# Patient Record
Sex: MALE | Race: WHITE
[De-identification: names, ages, dates, MRNs, and addresses within clinical notes are randomized per-mention and may not be internally consistent; named-entity substitution may affect disease eponyms.]

---

## 2021-10-10 NOTE — EDM.PDOC
ED HPI GENERAL MEDICAL PROBLEM





- General


Chief Complaint: Chest Pain


Stated Complaint: CHEST PAIN, TROUBLE BREATHING,LUMP ON LWR LFT BACK


Time Seen by Provider: 10/10/21 01:08


Source of Information: Reports: Patient


History Limitations: Reports: No Limitations





- History of Present Illness


INITIAL COMMENTS - FREE TEXT/NARRATIVE: 


Pt is here for chest pain that started earlier today. He notes that it only 

bothers him when he takes deep breaths, then it shoots to a 9/10. It is in the 

middle of his chest and feels sharp in nature. He has some back pain, but fell 

[place] [employer] when he stepped off the curb wrong. He has a 

chronic cough, but denies any changes. No shortness of breath other than having 

to take short breaths. No radiation of the pain. No chest tightness. No fevers 

or chills. He was to have a CT of his chest by his doctor, but it was cancelled 

per the pt as he has surgery coming up on tuesday for his prostate. Pt is a long

time smoker and is down to only 1 ppd. No recent illnesses, known sick contacts 

or exposure to COVID. No nausea or vomiting. 





  ** Chest


Pain Score (Numeric/FACES): 8





- Related Data


                                    Allergies











Allergy/AdvReac Type Severity Reaction Status Date / Time


 


No Known Allergies Allergy   Verified 12/27/15 13:40











Home Meds: 


                                    Home Meds





Aspirin 325 mg PO DAILY 03/04/14 [History]


Fenofibrate,Micronized [Fenofibrate] 1 cap PO DAILY 03/04/14 [History]


Gabapentin 600 mg PO BID 03/04/14 [History]


HCTZ/Triamterene [Maxzide 50-75 MG] 1 tab PO DAILY 03/04/14 [History]


Metoprolol Tartrate 50 mg PO BID 03/04/14 [History]


Multivitamin [Multivitamins] 1 each PO DAILY 03/04/14 [History]


Naproxen Sodium [Aleve] 3 tab PO BID PRN 03/04/14 [History]











Past Medical History


Cardiovascular History: Reports: CAD, High Cholesterol, Hypertension, PVD


Gastrointestinal History: Reports: Cholelithiasis, Gastritis, GERD, GI Bleed


Musculoskeletal History: Reports: Arthritis


Neurological History: Reports: Neuropathy, Diabetic, Neuropathy, Peripheral


Endocrine/Metabolic History: Reports: Diabetes, Type II, Obesity/BMI 30+





- Infectious Disease History


Infectious Disease History: Reports: Mumps





- Past Surgical History


GI Surgical History: Reports: Cholecystectomy





Social & Family History





- Family History


Cardiac: Reports: CAD, Hypertension





- Tobacco Use


Tobacco Use Status *Q: Current Every Day Tobacco User


Years of Tobacco use: 50


Packs/Tins Daily: 1





- Caffeine Use


Caffeine Use: Reports: None





- Recreational Drug Use


Recreational Drug Use: Yes


Recreational Drug Type: Reports: Marijuana/Hashish





- Living Situation & Occupation


Living situation: Reports: , Alone


Occupation: Employed





ED ROS GENERAL





- Review of Systems


Review Of Systems: Comprehensive ROS is negative, except as noted in HPI.





ED EXAM, GENERAL





- Physical Exam


Exam: See Below


Exam Limited By: No Limitations


General Appearance: Alert, WD/WN, No Apparent Distress


Eye Exam: Bilateral Eye: Normal Inspection


Throat/Mouth: Normal Voice, No Airway Compromise


Head: Atraumatic, Normocephalic


Neck: Normal Inspection, Supple


Respiratory/Chest: No Respiratory Distress, Lungs Clear, Normal Breath Sounds, 

No Accessory Muscle Use, Chest Non-Tender


Cardiovascular: Normal Peripheral Pulses, Regular Rate, Rhythm, No Murmur


GI/Abdominal: Normal Bowel Sounds, Soft, No Distention


 (Male) Exam: Deferred


Rectal (Males) Exam: Deferred


Back Exam: Full Range of Motion, Other (bruising noted on back from fall earlier

 this week)


Extremities: Normal Inspection, Normal Range of Motion, No Pedal Edema, Normal 

Capillary Refill


Neurological: Alert, Oriented, No Motor/Sensory Deficits


Psychiatric: Normal Affect, Normal Mood


Skin Exam: Warm, Dry, Intact


Lymphatic: No Adenopathy


  ** #1 Interpretation


EKG Date: 10/10/21


Time: 00:05


Rhythm: Other (sinus tachycardia)


Rate (Beats/Min): 104


Axis: LAD-Left Axis Deviation


P-Wave: Present


QRS: Normal


ST-T: Normal


QT: Normal





Course





- Vital Signs


Last Recorded V/S: 


                                Last Vital Signs











Temp  97 F   10/10/21 00:13


 


Pulse  102 H  10/10/21 00:13


 


Resp  16   10/10/21 00:13


 


BP  148/113 H  10/10/21 00:13


 


Pulse Ox  94 L  10/10/21 00:13














- Orders/Labs/Meds


Labs: 


                                Laboratory Tests











  10/10/21 10/10/21 Range/Units





  00:13 00:13 


 


WBC  10.3 H   (5.0-10.0)  10^3/uL


 


RBC  4.73   (4.6-6.2)  10^6/uL


 


Hgb  13.4 L D   (14.0-18.0)  g/dL


 


Hct  39.5 L   (40.0-54.0)  %


 


MCV  83.5  D   ()  fL


 


MCH  28.3   (27.0-34.0)  pg


 


MCHC  33.9   (33.0-35.0)  g/dL


 


Plt Count  170   (150-450)  10^3/uL


 


Neut % (Auto)  76.8 H   (42.2-75.2)  %


 


Lymph % (Auto)  13.5 L   (20.5-50.1)  %


 


Mono % (Auto)  6.1   (2-8)  %


 


Eos % (Auto)  3.1 H   (1.0-3.0)  %


 


Baso % (Auto)  0.5   (0.0-1.0)  %


 


Sodium   137  (136-145)  mmol/L


 


Potassium   3.5  (3.5-5.1)  mmol/L


 


Chloride   99  ()  mmol/L


 


Carbon Dioxide   23  (21-32)  mmol/L


 


Anion Gap   18.5 H  (7-13)  mEq/L


 


BUN   7  (7-18)  mg/dL


 


Creatinine   0.73  (0.70-1.30)  mg/dL


 


Est Cr Clr Drug Dosing   102.53  mL/min


 


Estimated GFR (MDRD)   > 60  


 


BUN/Creatinine Ratio   9.6  (No establ ref range)  


 


Glucose   156 H  (70-99)  mg/dL


 


Calcium   8.0 L  (8.5-10.1)  mg/dL


 


Total Bilirubin   0.9  (0.2-1.0)  mg/dL


 


AST   53 H  (15-37)  U/L


 


ALT   29  (16-63)  U/L


 


Alkaline Phosphatase   175 H  ()  U/L


 


Troponin I High Sens   7  (<=76)  pg/mL


 


Total Protein   7.7  (6.4-8.2)  g/dL


 


Albumin   3.3 L  (3.4-5.0)  g/dL


 


Globulin   4.4  


 


Albumin/Globulin Ratio   0.75  














- Re-Assessments/Exams


Free Text/Narrative Re-Assessment/Exam: 


Reviewed labs and CT with the pt. Reassured pt that his work up was normal. If 

his symptoms persist, or worsen, he is to call/return to the ER. Pt verbalized 

understanding. 


10/10/21 04:15








Departure





- Departure


Time of Disposition: 04:16


Disposition: Home, Self-Care 01


Condition: Fair


Clinical Impression: 


 Atypical chest pain





Instructions:  Nonspecific Chest Pain, Adult, Easy-to-Read


Forms:  ED Department Discharge


Additional Instructions: 


Tylenol and ibuprofen as needed for pain


May also try some tums or mylanta


If pain worsens, or does not improve in the next few days, call/return to the ER


Follow up with your primary care provider in 3-5 days, or sooner if needed.





Sepsis Event Note (ED)





- Evaluation


Sepsis Screening Result: No Definite Risk





- Focused Exam


Vital Signs: 


                                   Vital Signs











  Temp Pulse Resp BP Pulse Ox


 


 10/10/21 00:13  97 F  102 H  16  148/113 H  94 L

## 2023-02-20 ENCOUNTER — HOSPITAL ENCOUNTER (EMERGENCY)
Dept: HOSPITAL 43 - DL.ED | Age: 68
Discharge: NURSING, REHAB OR LONG-TERM CARE | End: 2023-02-20
Payer: MEDICARE

## 2023-02-20 VITALS — HEART RATE: 102 BPM | DIASTOLIC BLOOD PRESSURE: 48 MMHG | SYSTOLIC BLOOD PRESSURE: 101 MMHG

## 2023-02-20 DIAGNOSIS — E03.9: ICD-10-CM

## 2023-02-20 DIAGNOSIS — K21.9: ICD-10-CM

## 2023-02-20 DIAGNOSIS — I10: ICD-10-CM

## 2023-02-20 DIAGNOSIS — E11.42: ICD-10-CM

## 2023-02-20 DIAGNOSIS — Z72.0: ICD-10-CM

## 2023-02-20 DIAGNOSIS — K92.2: Primary | ICD-10-CM

## 2023-02-20 DIAGNOSIS — Z79.899: ICD-10-CM

## 2023-02-20 DIAGNOSIS — I25.10: ICD-10-CM

## 2023-02-20 DIAGNOSIS — Z91.040: ICD-10-CM

## 2023-02-20 LAB
ANION GAP SERPL CALC-SCNC: 14.9 MEQ/L (ref 7–13)
APTT PPP: 18.7 SEC (ref 22–34)
CHLORIDE SERPL-SCNC: 101 MMOL/L (ref 98–107)
EGFRCR SERPLBLD CKD-EPI 2021: 94 ML/MIN (ref 60–?)
SODIUM SERPL-SCNC: 136 MMOL/L (ref 136–145)

## 2023-02-20 PROCEDURE — P9016 RBC LEUKOCYTES REDUCED: HCPCS

## 2023-02-20 PROCEDURE — C9113 INJ PANTOPRAZOLE SODIUM, VIA: HCPCS

## 2023-03-06 ENCOUNTER — HOSPITAL ENCOUNTER (EMERGENCY)
Dept: HOSPITAL 43 - DL.ED | Age: 68
Discharge: NURSING, REHAB OR LONG-TERM CARE | End: 2023-03-06
Payer: MEDICARE

## 2023-03-06 VITALS — DIASTOLIC BLOOD PRESSURE: 64 MMHG | SYSTOLIC BLOOD PRESSURE: 112 MMHG

## 2023-03-06 VITALS — HEART RATE: 74 BPM

## 2023-03-06 DIAGNOSIS — I25.10: ICD-10-CM

## 2023-03-06 DIAGNOSIS — E80.7: Primary | ICD-10-CM

## 2023-03-06 DIAGNOSIS — E78.00: ICD-10-CM

## 2023-03-06 DIAGNOSIS — Z79.899: ICD-10-CM

## 2023-03-06 DIAGNOSIS — K21.9: ICD-10-CM

## 2023-03-06 DIAGNOSIS — D64.9: ICD-10-CM

## 2023-03-06 DIAGNOSIS — E03.9: ICD-10-CM

## 2023-03-06 DIAGNOSIS — E66.9: ICD-10-CM

## 2023-03-06 DIAGNOSIS — Z90.49: ICD-10-CM

## 2023-03-06 DIAGNOSIS — Z91.040: ICD-10-CM

## 2023-03-06 DIAGNOSIS — Z86.73: ICD-10-CM

## 2023-03-06 DIAGNOSIS — I10: ICD-10-CM

## 2023-03-06 DIAGNOSIS — E11.40: ICD-10-CM

## 2023-03-06 LAB
ANION GAP SERPL CALC-SCNC: 14.6 MEQ/L (ref 7–13)
APTT PPP: 24.5 SEC (ref 22–34)
CHLORIDE SERPL-SCNC: 95 MMOL/L (ref 98–107)
EGFRCR SERPLBLD CKD-EPI 2021: 96 ML/MIN (ref 60–?)
SODIUM SERPL-SCNC: 130 MMOL/L (ref 136–145)

## 2023-03-06 PROCEDURE — P9016 RBC LEUKOCYTES REDUCED: HCPCS

## 2023-03-06 PROCEDURE — 83880 ASSAY OF NATRIURETIC PEPTIDE: CPT

## 2023-03-06 PROCEDURE — 86850 RBC ANTIBODY SCREEN: CPT

## 2023-03-06 PROCEDURE — 93005 ELECTROCARDIOGRAM TRACING: CPT

## 2023-03-06 PROCEDURE — 87040 BLOOD CULTURE FOR BACTERIA: CPT

## 2023-03-06 PROCEDURE — 85025 COMPLETE CBC W/AUTO DIFF WBC: CPT

## 2023-03-06 PROCEDURE — 36430 TRANSFUSION BLD/BLD COMPNT: CPT

## 2023-03-06 PROCEDURE — 84484 ASSAY OF TROPONIN QUANT: CPT

## 2023-03-06 PROCEDURE — 80053 COMPREHEN METABOLIC PANEL: CPT

## 2023-03-06 PROCEDURE — 83605 ASSAY OF LACTIC ACID: CPT

## 2023-03-06 PROCEDURE — 86901 BLOOD TYPING SEROLOGIC RH(D): CPT

## 2023-03-06 PROCEDURE — 80307 DRUG TEST PRSMV CHEM ANLYZR: CPT

## 2023-03-06 PROCEDURE — 99285 EMERGENCY DEPT VISIT HI MDM: CPT

## 2023-03-06 PROCEDURE — 86920 COMPATIBILITY TEST SPIN: CPT

## 2023-03-06 PROCEDURE — 84145 PROCALCITONIN (PCT): CPT

## 2023-03-06 PROCEDURE — 85610 PROTHROMBIN TIME: CPT

## 2023-03-06 PROCEDURE — 86900 BLOOD TYPING SEROLOGIC ABO: CPT

## 2023-03-06 PROCEDURE — 81003 URINALYSIS AUTO W/O SCOPE: CPT

## 2023-03-06 PROCEDURE — 36415 COLL VENOUS BLD VENIPUNCTURE: CPT

## 2023-03-06 PROCEDURE — 83690 ASSAY OF LIPASE: CPT

## 2023-03-06 PROCEDURE — 82150 ASSAY OF AMYLASE: CPT

## 2023-03-06 PROCEDURE — 85730 THROMBOPLASTIN TIME PARTIAL: CPT

## 2023-03-06 PROCEDURE — 86140 C-REACTIVE PROTEIN: CPT

## 2023-03-06 PROCEDURE — 83735 ASSAY OF MAGNESIUM: CPT

## 2023-03-06 PROCEDURE — 86922 COMPATIBILITY TEST ANTIGLOB: CPT
